# Patient Record
(demographics unavailable — no encounter records)

---

## 2025-01-23 NOTE — HISTORY OF PRESENT ILLNESS
[de-identified] : 76 year old  female patient with history of stable Seizures, Hypothyroidism, Lumbar Radiculopathy, Allergic Rhinosinusitis, Vitamin D Deficiency, Reactive Airway Disease, history as stated, presented for follow up examination with concern about possible elevated blood pressure. Patient is compliant with all medications. ROS as stated.

## 2025-01-23 NOTE — ASSESSMENT
[FreeTextEntry1] : 76 year old female found to have stable Seizures, Hypothyroidism, Lumbar Radiculopathy, Allergic Rhinosinusitis, Vitamin D Deficiency, Reactive Airway Disease, with the current prescription regimen as recommended, diet and lifestyle modifications, as counseled. Prior results reviewed, interpreted and discussed with the patient during today's examination, as appropriate. Follow up, treatment plan and tests, as ordered.  EKG: Sinus Rhythm @ 64 BPM. No new acute ST-T changes noted.  Total time spent:: 25 minutes Including: Preparation prior to visit - Reviewing prior record, results of tests and Consultation Reports as applicable Conducting an appropriate H & P during today's encounter Appropriate orders for tests, medications and procedures, as applicable Counseling patient Note completion

## 2025-01-23 NOTE — HISTORY OF PRESENT ILLNESS
[de-identified] : 76 year old  female patient with history of stable Seizures, Hypothyroidism, Lumbar Radiculopathy, Allergic Rhinosinusitis, Vitamin D Deficiency, Reactive Airway Disease, history as stated, presented for follow up examination with concern about possible elevated blood pressure. Patient is compliant with all medications. ROS as stated.

## 2025-01-23 NOTE — HEALTH RISK ASSESSMENT
[No] : In the past 12 months have you used drugs other than those required for medical reasons? No [No falls in past year] : Patient reported no falls in the past year [0] : 2) Feeling down, depressed, or hopeless: Not at all (0) [Never] : Never [de-identified] : None [[id number]] : 0

## 2025-01-23 NOTE — HEALTH RISK ASSESSMENT
[No] : In the past 12 months have you used drugs other than those required for medical reasons? No [No falls in past year] : Patient reported no falls in the past year [0] : 2) Feeling down, depressed, or hopeless: Not at all (0) [Never] : Never [de-identified] : None [[id number]] : 0

## 2025-02-25 NOTE — PHYSICAL EXAM
[FreeTextEntry3] :  Gen:                        Well appearing, well nourished, NAD. Skin:                       Eccrine:                 Within normal limits   Hair:                      Normal density and distribution    Face:                      Neck:                       Neuro:                     No focal deficits. Age appropriate. Psych:                     Appropriate affect.

## 2025-02-25 NOTE — HISTORY OF PRESENT ILLNESS
[FreeTextEntry1] : seb derm on the face [de-identified] : seb derm on the face - failed TAC cream - does not like would like stronger tx

## 2025-02-25 NOTE — ASSESSMENT
[FreeTextEntry1] : -- seb derm on the face - failed TAC cream - does not like would like stronger tx  #seb derm - new issue, flaring -dx, nature mgmt options disc. plan for combo PO/topical regimen. if adding TCS in furture, pt does not like TAC cream -start keto cream to entire face BID -start fluconazole 150mg once weekly x 3 weeks -can additionally use cetaphil cream and cetaphil gentle cleanser otc

## 2025-03-03 NOTE — PHYSICAL EXAM
[Chaperone Present] : A chaperone was present in the examining room during all aspects of the physical examination [FreeTextEntry2] : GLEN ALERT [Soft] : soft [Non-tender] : non-tender [Examination Of The Breasts] : a normal appearance [No Masses] : no breast masses were palpable [Labia Majora] : normal [Labia Minora] : normal [Normal] : normal [Uterine Adnexae] : normal

## 2025-03-25 NOTE — ASSESSMENT
[FreeTextEntry1] : -- seb derm on the face - failed TAC cream - does not like - declines additional TCS. Declines trial of HC Failed keto cream - declined trial PO fluconazole - declines additional antifungal Pt noted some improvement with changing her soap recently to something that a friend uses - name unclear, however not using any rx at this time SD flaring on brows and NLF  #seb derm -  flaring -dx, nature mgmt options disc -start pimecrolimus cream to AA BID. SED incl black box warning on the package -if fails, consider zoryve, loprox, reassess willingness for HC

## 2025-03-25 NOTE — HISTORY OF PRESENT ILLNESS
[FreeTextEntry1] : seb derm on the face [de-identified] : seb derm on the face - failed TAC cream - does not like - declines additional TCS. Declines trial of HC Failed keto cream - declined trial PO fluconazole - declines additional antifungal Pt noted some improvement with changing her soap recently to something that a friend uses - name unclear, however not using any rx at this time SD flaring on brows and NLF

## 2025-03-25 NOTE — PHYSICAL EXAM
[FreeTextEntry3] :  Gen:                        Well appearing, well nourished, NAD. Skin:                       Eccrine:                 Within normal limits   Hair:                      Normal density and distribution    Face:                                       Neuro:                     No focal deficits. Age appropriate. Psych:                     Appropriate affect.

## 2025-07-21 NOTE — HISTORY OF PRESENT ILLNESS
[de-identified] : Patient is a 77 yr old F with a PMHx of OA and meniscal injury. She reports the pain has been increasing over the last 3-4 years most significant at the medial aspect of the left knee. She notes mild intermittent swelling around the knee. The patient has not undergone physical therapy for her knees, but reports using OTC lidocaine patches for pain control with minimal relief.

## 2025-07-21 NOTE — PHYSICAL EXAM
[de-identified] : Knee (L)  Inspection Skin: normal Effusion: mild Bursa swelling: none  Palpation Tenderness: + over medial joint line  Crepitus: +. Defect: none. Popliteal fullness: negative.  Flexion Active ROM: normal Passive ROM: normal with pain at end of range  Extension Normal  Motor strength Flexion: 5/5 Extension: 5/5  Sensory index Normal.  ACL/PCL tests Lachman test: stable Anterior drawer: stable Posterior drawer: stable  MCL/LCL tests MCL laxity: stable LCL laxity: stable  Meniscal tests Edwin's test: + Thessalys: +    [de-identified] : MRI L knee Date: 8/30/2021   Performed at [facility]: Yes  Impression: Focal truncated tearing of the medial meniscus and associated severe medial compartment chondral wear. Free edge tearing of the lateral meniscus posterior horn and associated mild lateral compartment chondral wear. Moderate patellofemoral compartment chondral wear.  These images were personally reviewed with original findings documented as above.

## 2025-07-21 NOTE — DISCUSSION/SUMMARY
[de-identified] : 77yr old F here with acute on chronic L knee pain 2/2 OA flare. Discussed diagnosis and plan with patient as outlined below. Lengthy discussion was had in terms of treatment for OA and degenerative menisucs tear.  Plan 1. Referral provided for PT. Also provided with HEP printout. 2. Patient advised to continue with conservative care including OTC pain relievers and topical salonpas combination lidocaine patches PRN. 3. OA Informational Handout provided. Patient not interested in other treatments at this time. 3. Follow up 2-3 months, can consider HA or CSI.

## 2025-07-29 NOTE — HEALTH RISK ASSESSMENT
[No] : In the past 12 months have you used drugs other than those required for medical reasons? No [No falls in past year] : Patient reported no falls in the past year [0] : 2) Feeling down, depressed, or hopeless: Not at all (0) [Never] : Never [de-identified] : ORTHO [[id number]] : 0

## 2025-07-29 NOTE — ASSESSMENT
[FreeTextEntry1] : 77 year old female found to have stable Seizures, Hypothyroidism, Lumbar Radiculopathy, Allergic Rhinosinusitis, Vitamin D Deficiency, Reactive Airway Disease, with the current prescription regimen as recommended, diet and lifestyle modifications, as counseled. Prior results reviewed, interpreted and discussed with the patient during today's examination, as appropriate. Follow up, treatment plan and tests, as ordered.  Total time spent:: 25 minutes Including: Preparation prior to visit - Reviewing prior record, results of tests and Consultation Reports as applicable Conducting an appropriate H & P during today's encounter Appropriate orders for tests, medications and procedures, as applicable Counseling patient Note completion

## 2025-07-29 NOTE — HISTORY OF PRESENT ILLNESS
[de-identified] : 77 year old  female patient with history of stable Seizures, Hypothyroidism, Lumbar Radiculopathy, Allergic Rhinosinusitis, Vitamin D Deficiency, Reactive Airway Disease, history as stated, presented for follow up examination. Patient is compliant with all medications. ROS as stated.  No reported break through seizures.

## 2025-07-29 NOTE — HEALTH RISK ASSESSMENT
[No] : In the past 12 months have you used drugs other than those required for medical reasons? No [No falls in past year] : Patient reported no falls in the past year [0] : 2) Feeling down, depressed, or hopeless: Not at all (0) [Never] : Never [de-identified] : ORTHO [[id number]] : 0

## 2025-07-29 NOTE — HISTORY OF PRESENT ILLNESS
[de-identified] : 77 year old  female patient with history of stable Seizures, Hypothyroidism, Lumbar Radiculopathy, Allergic Rhinosinusitis, Vitamin D Deficiency, Reactive Airway Disease, history as stated, presented for follow up examination. Patient is compliant with all medications. ROS as stated.  No reported break through seizures.